# Patient Record
Sex: MALE | Race: WHITE | NOT HISPANIC OR LATINO
[De-identification: names, ages, dates, MRNs, and addresses within clinical notes are randomized per-mention and may not be internally consistent; named-entity substitution may affect disease eponyms.]

---

## 2018-03-28 PROBLEM — Z00.00 ENCOUNTER FOR PREVENTIVE HEALTH EXAMINATION: Status: ACTIVE | Noted: 2018-03-28

## 2018-04-16 ENCOUNTER — RX RENEWAL (OUTPATIENT)
Age: 78
End: 2018-04-16

## 2018-05-24 ENCOUNTER — RX RENEWAL (OUTPATIENT)
Age: 78
End: 2018-05-24

## 2018-08-02 ENCOUNTER — APPOINTMENT (OUTPATIENT)
Dept: CARDIOLOGY | Facility: CLINIC | Age: 78
End: 2018-08-02

## 2018-08-02 VITALS
HEART RATE: 74 BPM | TEMPERATURE: 97.9 F | HEIGHT: 67 IN | BODY MASS INDEX: 25.9 KG/M2 | WEIGHT: 165 LBS | SYSTOLIC BLOOD PRESSURE: 143 MMHG | OXYGEN SATURATION: 95 % | DIASTOLIC BLOOD PRESSURE: 82 MMHG

## 2018-08-02 DIAGNOSIS — Z80.51 FAMILY HISTORY OF MALIGNANT NEOPLASM OF KIDNEY: ICD-10-CM

## 2018-08-02 DIAGNOSIS — Z82.49 FAMILY HISTORY OF ISCHEMIC HEART DISEASE AND OTHER DISEASES OF THE CIRCULATORY SYSTEM: ICD-10-CM

## 2018-08-02 DIAGNOSIS — R91.1 SOLITARY PULMONARY NODULE: ICD-10-CM

## 2018-08-02 DIAGNOSIS — Z87.19 PERSONAL HISTORY OF OTHER DISEASES OF THE DIGESTIVE SYSTEM: ICD-10-CM

## 2018-08-02 DIAGNOSIS — N40.0 BENIGN PROSTATIC HYPERPLASIA WITHOUT LOWER URINARY TRACT SYMPMS: ICD-10-CM

## 2018-08-02 DIAGNOSIS — M19.90 UNSPECIFIED OSTEOARTHRITIS, UNSPECIFIED SITE: ICD-10-CM

## 2018-08-02 DIAGNOSIS — Z87.891 PERSONAL HISTORY OF NICOTINE DEPENDENCE: ICD-10-CM

## 2018-08-02 DIAGNOSIS — Z86.010 PERSONAL HISTORY OF COLONIC POLYPS: ICD-10-CM

## 2018-08-02 DIAGNOSIS — Z85.46 PERSONAL HISTORY OF MALIGNANT NEOPLASM OF PROSTATE: ICD-10-CM

## 2018-08-02 DIAGNOSIS — Z86.39 PERSONAL HISTORY OF OTHER ENDOCRINE, NUTRITIONAL AND METABOLIC DISEASE: ICD-10-CM

## 2018-08-02 DIAGNOSIS — Z86.79 PERSONAL HISTORY OF OTHER DISEASES OF THE CIRCULATORY SYSTEM: ICD-10-CM

## 2018-08-02 DIAGNOSIS — Z86.03 PERSONAL HISTORY OF NEOPLASM OF UNCERTAIN BEHAVIOR: ICD-10-CM

## 2018-08-06 PROBLEM — Z82.49 FAMILY HISTORY OF VALVULAR HEART DISEASE: Status: ACTIVE | Noted: 2018-08-06

## 2018-08-06 PROBLEM — Z82.49 FAMILY HISTORY OF HYPERTENSION: Status: ACTIVE | Noted: 2018-08-06

## 2018-08-07 ENCOUNTER — NON-APPOINTMENT (OUTPATIENT)
Age: 78
End: 2018-08-07

## 2018-08-07 PROBLEM — Z86.010 HISTORY OF COLONIC POLYPS: Status: RESOLVED | Noted: 2018-08-07 | Resolved: 2018-08-07

## 2018-08-07 PROBLEM — N40.0 BPH WITHOUT URINARY OBSTRUCTION: Status: RESOLVED | Noted: 2018-08-07 | Resolved: 2018-08-07

## 2018-08-07 PROBLEM — M19.90 OSTEOARTHRITIS: Status: RESOLVED | Noted: 2018-08-07 | Resolved: 2018-08-07

## 2018-08-07 PROBLEM — Z87.891 FORMER SMOKER: Status: ACTIVE | Noted: 2018-08-07

## 2018-08-07 PROBLEM — Z87.19 HISTORY OF GASTRITIS: Status: RESOLVED | Noted: 2018-08-07 | Resolved: 2018-08-07

## 2018-08-07 PROBLEM — Z86.79 HISTORY OF HYPERTENSION: Status: RESOLVED | Noted: 2018-08-07 | Resolved: 2018-08-07

## 2018-08-07 PROBLEM — Z82.49 FAMILY HISTORY OF ACUTE MYOCARDIAL INFARCTION: Status: ACTIVE | Noted: 2018-08-07

## 2018-08-07 PROBLEM — Z80.51 FAMILY HISTORY OF MALIGNANT NEOPLASM OF KIDNEY: Status: ACTIVE | Noted: 2018-08-07

## 2018-08-07 PROBLEM — R91.1 PULMONARY NODULE: Status: RESOLVED | Noted: 2018-08-07 | Resolved: 2018-08-07

## 2018-08-07 PROBLEM — Z86.39 HISTORY OF TYPE 2 DIABETES MELLITUS: Status: RESOLVED | Noted: 2018-08-07 | Resolved: 2018-08-07

## 2018-08-07 PROBLEM — Z86.03 HISTORY OF MONOCLONAL GAMMOPATHY: Status: RESOLVED | Noted: 2018-08-07 | Resolved: 2018-08-07

## 2018-08-07 PROBLEM — Z86.39 HISTORY OF HYPERLIPIDEMIA: Status: RESOLVED | Noted: 2018-08-07 | Resolved: 2018-08-07

## 2018-08-07 PROBLEM — Z85.46 HISTORY OF MALIGNANT NEOPLASM OF PROSTATE: Status: RESOLVED | Noted: 2018-08-07 | Resolved: 2018-08-07

## 2018-08-07 RX ORDER — ATORVASTATIN CALCIUM 10 MG/1
10 TABLET, FILM COATED ORAL
Refills: 0 | Status: ACTIVE | COMMUNITY

## 2018-08-07 RX ORDER — CHOLECALCIFEROL (VITAMIN D3) 25 MCG
TABLET ORAL
Refills: 0 | Status: ACTIVE | COMMUNITY

## 2018-08-07 RX ORDER — VITAMIN B COMPLEX
CAPSULE ORAL
Refills: 0 | Status: ACTIVE | COMMUNITY

## 2018-08-07 RX ORDER — VIT A/VIT C/VIT E/ZINC/COPPER 2148-113
TABLET ORAL
Refills: 0 | Status: ACTIVE | COMMUNITY

## 2018-08-07 RX ORDER — UBIDECARENONE/VIT E ACET 100MG-5
CAPSULE ORAL
Refills: 0 | Status: ACTIVE | COMMUNITY

## 2019-02-25 ENCOUNTER — APPOINTMENT (OUTPATIENT)
Dept: CARDIOLOGY | Facility: CLINIC | Age: 79
End: 2019-02-25
Payer: MEDICARE

## 2019-02-25 VITALS
HEART RATE: 68 BPM | WEIGHT: 165 LBS | OXYGEN SATURATION: 97 % | DIASTOLIC BLOOD PRESSURE: 69 MMHG | BODY MASS INDEX: 25.84 KG/M2 | SYSTOLIC BLOOD PRESSURE: 142 MMHG

## 2019-02-25 DIAGNOSIS — I25.10 ATHEROSCLEROTIC HEART DISEASE OF NATIVE CORONARY ARTERY W/OUT ANGINA PECTORIS: ICD-10-CM

## 2019-02-25 PROCEDURE — 99215 OFFICE O/P EST HI 40 MIN: CPT

## 2019-02-25 PROCEDURE — 93000 ELECTROCARDIOGRAM COMPLETE: CPT

## 2019-02-26 ENCOUNTER — NON-APPOINTMENT (OUTPATIENT)
Age: 79
End: 2019-02-26

## 2019-02-26 PROBLEM — I25.10 CORONARY ATHEROSCLEROSIS OF NATIVE CORONARY ARTERY: Status: RESOLVED | Noted: 2018-08-07 | Resolved: 2019-02-26

## 2019-02-26 RX ORDER — CALCIUM CARBONATE 600 MG
TABLET ORAL
Refills: 0 | Status: ACTIVE | COMMUNITY

## 2019-02-26 NOTE — DISCUSSION/SUMMARY
[FreeTextEntry1] : Atherosclerotic heart disease\par Atherosclerotic heart disease is stable. A 4/15 stress sestamibi study was normal. An 11/17 CT coronary angiogram  demonstrated a mildly elevated calcium score at 86. The LAD had a 1-25% stenosis the left circumflex 25-50% stenosis . T he  RCA was patent. A 2/19 chest CAT scan demonstrated mild coronary artery calcifications The resting 2/19 electrocardiogram shows no evidence of myocardial  ischemia or infarction. Left ventricular systolic function is normal as reflected by a left ventricular ejection fraction of 88% on the 4/15 gated sestamibi study 65-70% on a  7/16 echocardiogram and 61% on  an 11/17 CT angiogram. In view of the lack of angina, above noninvasive studies and clinical course continued medical management is indicated.\par \par I have recommended the following:\par 1.Continue the present medical regimen\par 2 no further cardiac testing for this problem at this time\par 3 risk factor modification \par \par Hypertension\par Hypertension has been controlled on the present medical regimen. In the setting of atherosclerotic heart disease and diabetes ACE-I/ARB therapy and beta blockers are attractive. Previous administration of ACE inhibitors ( lisinopril) were discontinued due to coughing. The doses of losartan  and metoprolol may be increased if required to maintain optimal levels.\par \par I have recommended the following:\par 1 low-salt low-fat low-cholesterol diabetic diet. Regular aerobic exercise\par 2 continue present medical regimen\par 3 increase Cozaar to and/or metoprolol doses if required to maintain optimal levels as discussed above.\par \par \par Valvular heart disease\par The 7/16 echocardiogram revealed mild thickening of the mitral valve leaflets and mild mitral insufficiency. Minimal aortic insufficiency was noted. The lethargy the ejection fraction was 65-70%. The present cardiac physical examination is not suggestive of severe mitral regurgitation. In view of the lack of symptoms, absence of heart failure clinical course continued monitoring without intervention is indicated at this time.\par \par I have recommended the following:\par 1. No further cardiac testing for this problem at this time\par 2. Anticipate echocardiogram reevaluation late 2019 or 2020\par \par \par Hyperlipidemia\par Hyperlipidemia represents a risk factor for progressive atherosclerotic disease. In the setting of atherosclerotic heart disease the target LDL level is about 70. The most recent lipid levels are not available for review. The dose of atorvastatin may be increased if required to obtain optimal levels. Nonpharmacological therapy, specifically diet exercise and weight loss are emphasized as major aspects of treatment.\par \par I have recommended the following:\par 1 low salt low fat low cholesterol diet. Regular aerobic exercise and weight loss\par 2 continue the present medical regimen\par 3 routine laboratory studies including lipid profile through primary care Dr. Guo\par 4 target LDL level to about 70 as discussed above\par 5 increase atorvastatin dose if required to obtain optimal levels as noted above.

## 2019-02-26 NOTE — PHYSICAL EXAM
[Normal Conjunctiva] : the conjunctiva exhibited no abnormalities [Auscultation Breath Sounds / Voice Sounds] : lungs were clear to auscultation bilaterally [Heart Rate And Rhythm] : heart rate and rhythm were normal [Heart Sounds] : normal S1 and S2 [Murmurs] : no murmurs present [Arterial Pulses Normal] : the arterial pulses were normal [Bowel Sounds] : normal bowel sounds [Abdomen Soft] : soft [Abdomen Tenderness] : non-tender [Abnormal Walk] : normal gait [Cyanosis, Localized] : no localized cyanosis [] : no rash [Affect] : the affect was normal [No Anxiety] : not feeling anxious [Edema] : no peripheral edema present [FreeTextEntry1] : dorsalis pedis pulses +2 bilaterally

## 2019-02-26 NOTE — REVIEW OF SYSTEMS
[Feeling Fatigued] : feeling fatigued [Eyeglasses] : currently wearing eyeglasses [Joint Pain] : joint pain [Negative] : Psychiatric

## 2019-02-26 NOTE — HISTORY OF PRESENT ILLNESS
[FreeTextEntry1] : 79-year-old man\par Routine followup\par "I feel okay" Mild dyspnea on exertion when going uphill is unchanged in the past and not progressively severe. No chest pain. No palpitations. No ankle edema. No orthopnea. No syncope.\par \par Royer is accompanied today by his wife.

## 2019-04-10 ENCOUNTER — APPOINTMENT (OUTPATIENT)
Dept: GASTROENTEROLOGY | Facility: CLINIC | Age: 79
End: 2019-04-10
Payer: MEDICARE

## 2019-04-10 ENCOUNTER — RECORD ABSTRACTING (OUTPATIENT)
Age: 79
End: 2019-04-10

## 2019-04-10 VITALS
SYSTOLIC BLOOD PRESSURE: 133 MMHG | HEIGHT: 67 IN | DIASTOLIC BLOOD PRESSURE: 85 MMHG | WEIGHT: 165 LBS | BODY MASS INDEX: 25.9 KG/M2 | HEART RATE: 66 BPM

## 2019-04-10 DIAGNOSIS — K21.9 GASTRO-ESOPHAGEAL REFLUX DISEASE W/OUT ESOPHAGITIS: ICD-10-CM

## 2019-04-10 PROCEDURE — 99214 OFFICE O/P EST MOD 30 MIN: CPT

## 2019-04-10 NOTE — ASSESSMENT
[FreeTextEntry1] : 1.  patient has recently noted rectal bleeding over the last three weeks\par 2.  he also has hx of prostate cancer, and he underwent external beam radiation last may and june 2018, over a period of eight weeds\par 3.  although there has been blood lately, none prior to that\par 4.  no mucous, no tenesmus...\par 5.  sister may have had colon cancer [he thinks]\par 6.  there is a comedone several cm from anal canal\par \par AS WE OBTAIN INFORMED CONSENT FOR COLONOSCOPY, UPPER ENDOSCOPY [EGD], OR BOTH PROCEDURES TOGETHER;\par \par As with all procedures, there are risks of which the patient should be aware\par \par 1.  Anesthesia; deep sedation with Propofol;  there is a small risk of aspiration and pulmonary infection.  The Anesthesiologist meets with the patient the morning of the procedure to discuss in more detail\par \par 2.  risk of bleeding; from removal of a polyp, or rarely, from biopsies, 1 % or less\par \par 3.  risk of injury or perforation of the colon or upper GI tract; one in a thousand or less,  from removing a polyp or from advancing the instrument\par \par \par DR FIELD RECOMMENDATIONS FOR OPTIMAL BOWEL PREP\par \par 1. split the Miralax drink: half the night before, and half the morning of\par 2.  dulcolax 10 mg [2 5 mg tablets]  before each half of the drink\par 3.  diet for breakfast and lunch the day before prep; light solid food, easily chewable, easily digested\par 4.  begin the prep the night before sometime after five pm, one hour after dulcolax is taken\par 5.  the night before; goal is to have loose bowel movements; take one to three extra doses of Miralax, either a 17 gram packet, or a scoopful of the powder, if needed to achieve loose stools\par 6.  the goal the morning of is to have clear liquid stools; otherwise, after the second half of the prep, you should take one to three extra doses of Miralax [see above]\par 7.  remember; the extra doses are only if you are not achieving good results; and the well prepped colon allows a more complete exam\par \par \par More than 50% of the face to face time was devoted to counseling and /or coordination of care.  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

## 2019-04-10 NOTE — REVIEW OF SYSTEMS
[FreeTextEntry5] : patient on metoprolol and losartan, dr Black...had CT angio., and there was mild obstructive dx...not major, but he was placed on these meds eight months ago..Dr Black did not advise any further cardovascular testing, as patient is basically free of any cardiovascular symptoms

## 2019-04-10 NOTE — PHYSICAL EXAM
[Normal Sphincter Tone] : normal sphincter tone [No Rectal Mass] : no rectal mass [Internal Hemorrhoid] : no internal hemorrhoids [External Hemorrhoid] : no external hemorrhoids [Occult Blood Positive] : stool was negative for occult blood

## 2019-04-10 NOTE — HISTORY OF PRESENT ILLNESS
[FreeTextEntry1] : 1.  patient presents for eval of scant blood on the surface of his bowel movements, occurs perhaps two or three days per week,\par onset of this symptoms was three weeks ago\par \par it appears there are clots on surface of stool...red and dark..\par \par had colonoscopy Nov 2016\par has had multiple colon polyps over the years..\par \par had a small boil karla anal recently\par \par when he needs to strain, he puts himself on metamucil and he is generally fine\par \par stool has not changed.\par \par the boil or small papule has not appeared..

## 2019-04-19 ENCOUNTER — APPOINTMENT (OUTPATIENT)
Dept: GASTROENTEROLOGY | Facility: HOSPITAL | Age: 79
End: 2019-04-19

## 2020-06-04 ENCOUNTER — APPOINTMENT (OUTPATIENT)
Dept: CARDIOLOGY | Facility: CLINIC | Age: 80
End: 2020-06-04
Payer: MEDICARE

## 2020-06-04 ENCOUNTER — NON-APPOINTMENT (OUTPATIENT)
Age: 80
End: 2020-06-04

## 2020-06-04 VITALS
SYSTOLIC BLOOD PRESSURE: 130 MMHG | DIASTOLIC BLOOD PRESSURE: 80 MMHG | WEIGHT: 157 LBS | OXYGEN SATURATION: 97 % | BODY MASS INDEX: 24.59 KG/M2 | HEART RATE: 85 BPM

## 2020-06-04 VITALS
SYSTOLIC BLOOD PRESSURE: 130 MMHG | BODY MASS INDEX: 24.59 KG/M2 | HEART RATE: 85 BPM | WEIGHT: 157 LBS | DIASTOLIC BLOOD PRESSURE: 80 MMHG | OXYGEN SATURATION: 97 %

## 2020-06-04 DIAGNOSIS — Z86.69 PERSONAL HISTORY OF OTHER DISEASES OF THE NERVOUS SYSTEM AND SENSE ORGANS: ICD-10-CM

## 2020-06-04 PROCEDURE — 93000 ELECTROCARDIOGRAM COMPLETE: CPT

## 2020-06-04 PROCEDURE — 99215 OFFICE O/P EST HI 40 MIN: CPT

## 2020-06-05 PROBLEM — Z86.69 HISTORY OF RETINAL DETACHMENT: Status: RESOLVED | Noted: 2020-06-05 | Resolved: 2020-06-05

## 2020-06-05 RX ORDER — DENOSUMAB 60 MG/ML
60 INJECTION SUBCUTANEOUS
Refills: 0 | Status: ACTIVE | COMMUNITY

## 2020-06-05 NOTE — PHYSICAL EXAM
[Normal Conjunctiva] : the conjunctiva exhibited no abnormalities [Auscultation Breath Sounds / Voice Sounds] : lungs were clear to auscultation bilaterally [Heart Rate And Rhythm] : heart rate and rhythm were normal [Heart Sounds] : normal S1 and S2 [Murmurs] : no murmurs present [Arterial Pulses Normal] : the arterial pulses were normal [Edema] : no peripheral edema present [Bowel Sounds] : normal bowel sounds [Abdomen Soft] : soft [Abdomen Tenderness] : non-tender [Abnormal Walk] : normal gait [Cyanosis, Localized] : no localized cyanosis [] : no rash [Affect] : the affect was normal [No Anxiety] : not feeling anxious [FreeTextEntry1] : dorsalis pedis pulses +2 bilaterally

## 2020-06-05 NOTE — HISTORY OF PRESENT ILLNESS
[FreeTextEntry1] : 80-year-old man\par Routine followup\par Dyspnea on exertion is unchanged from in the past. No chest pain. No palpitations. No ankle edema. No orthopnea. No syncope.  Mr. 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

## 2020-06-05 NOTE — REVIEW OF SYSTEMS
[Feeling Fatigued] : feeling fatigued [Eyeglasses] : currently wearing eyeglasses [Cough] : cough [Joint Pain] : joint pain [Negative] : Psychiatric

## 2020-06-05 NOTE — DISCUSSION/SUMMARY
[FreeTextEntry1] : Atherosclerotic heart disease\par Atherosclerotic heart disease is stable. A 4/15 stress sestamibi study was normal. An 11/17 CT coronary angiogram  demonstrated a mildly elevated calcium score at 86. The LAD had a 1-25% stenosis the left circumflex 25-50% stenosis . T he  RCA was patent. A 2/19 chest CAT scan demonstrated mild coronary artery calcifications The resting 6/20  electrocardiogram shows no evidence of myocardial  ischemia or infarction. Left ventricular systolic function is normal as reflected by a left ventricular ejection fraction of 88% on the 4/15 gated sestamibi study 65-70% on a  7/16 echocardiogram and 61% on  an 11/17 CT angiogram. In view of the lack of angina, above noninvasive studies and clinical course continued medical management is indicated.\par \par I have recommended the following:\par 1.Continue the present medical regimen including aspirin  81 mg/day  (prescribed but has not been taking )\par 2 no further cardiac testing for this problem at this time\par 3 risk factor modification \par 4. Echocardiogram  with  next office evaluation in 6 months\par \par \par Hypertension\par Hypertension has been controlled on the present medical regimen. In the setting of atherosclerotic heart disease and diabetes ACE-I/ARB therapy and beta blockers are attractive. Previous administration of ACE inhibitors ( lisinopril) were discontinued due to coughing. The doses of losartan  and metoprolol may be increased if required to maintain optimal levels.\par \par I have recommended the following:\par 1 low-salt low-fat low-cholesterol diabetic diet. Regular aerobic exercise\par 2 continue present medical regimen\par 3 increase Cozaar  and/or metoprolol doses if required to maintain optimal levels as discussed above.\par \par \par Valvular heart disease\par The 7/16 echocardiogram revealed mild thickening of the mitral valve leaflets and mild mitral insufficiency. Minimal aortic insufficiency was noted. The lethargy the ejection fraction was 65-70%. The present cardiac physical examination is not suggestive of severe mitral regurgitation. In view of the lack of symptoms, absence of heart failure clinical course continued monitoring without intervention is indicated at this time.\par \par I have recommended the following:\par 1. No further cardiac testing for this problem at this time\par 2. Echocardiogram reevaluation  with next office evaluation in 6 months\par \par \par \par Hyperlipidemia\par Hyperlipidemia represents a risk factor for progressive atherosclerotic disease. In the setting of atherosclerotic heart disease the target LDL level is about 70. The most recent lipid levels are not available for review. The dose of atorvastatin may be increased if required to obtain optimal levels. Nonpharmacological therapy, specifically diet exercise and weight loss are emphasized as major aspects of treatment.\par \par I have recommended the following:\par 1 low salt low fat low cholesterol diet. Regular aerobic exercise and weight loss\par 2 continue the present medical regimen\par 3 routine laboratory studies including lipid profile through primary care Dr. Guo\par 4 target LDL level to about 70 as discussed above\par 5 increase atorvastatin dose if required to obtain optimal levels as noted above. \par \par \par The diagnosis, prognosis, risks, options and alternatives were explained at length to the patient. All questions were answered. Issues discussed included atherosclerotic heart disease, hypertension, hyperlipidemia and valvular heart disease.\par \par Counseling and/or coordination of care\par Time was a significant factor this patient encounter. Total time spent with the patient was 40 minutes. 50% of the time was devoted to counseling and/or coordination of care.

## 2020-12-03 ENCOUNTER — APPOINTMENT (OUTPATIENT)
Dept: CARDIOLOGY | Facility: CLINIC | Age: 80
End: 2020-12-03
Payer: MEDICARE

## 2020-12-03 PROCEDURE — 93306 TTE W/DOPPLER COMPLETE: CPT

## 2020-12-06 DIAGNOSIS — Z86.79 PERSONAL HISTORY OF OTHER DISEASES OF THE CIRCULATORY SYSTEM: ICD-10-CM

## 2020-12-09 ENCOUNTER — APPOINTMENT (OUTPATIENT)
Dept: CARDIOLOGY | Facility: CLINIC | Age: 80
End: 2020-12-09
Payer: MEDICARE

## 2020-12-09 ENCOUNTER — NON-APPOINTMENT (OUTPATIENT)
Age: 80
End: 2020-12-09

## 2020-12-09 VITALS
SYSTOLIC BLOOD PRESSURE: 130 MMHG | WEIGHT: 162.44 LBS | OXYGEN SATURATION: 97 % | TEMPERATURE: 97.2 F | DIASTOLIC BLOOD PRESSURE: 70 MMHG | HEART RATE: 68 BPM | BODY MASS INDEX: 25.44 KG/M2

## 2020-12-09 PROCEDURE — 93000 ELECTROCARDIOGRAM COMPLETE: CPT

## 2020-12-09 PROCEDURE — 99215 OFFICE O/P EST HI 40 MIN: CPT

## 2020-12-10 RX ORDER — METFORMIN HYDROCHLORIDE 500 MG/1
500 TABLET, COATED ORAL
Refills: 0 | Status: ACTIVE | COMMUNITY

## 2020-12-10 NOTE — DISCUSSION/SUMMARY
[FreeTextEntry1] : Atherosclerotic heart disease\par Atherosclerotic heart disease is stable. A 4/15 stress sestamibi study was normal. An 11/17 CT coronary angiogram  demonstrated a mildly elevated calcium score at 86. The LAD had a 1-25% stenosis the left circumflex 25-50% stenosis . T he  RCA was patent. A 2/19 chest CAT scan demonstrated mild coronary artery calcifications The resting 6/20  electrocardiogram shows no evidence of myocardial  ischemia or infarction. Left ventricular systolic function is normal as reflected by a left ventricular ejection fraction of 88% on the 4/15 gated sestamibi study  61% on  an 11/17 CT angiogram and 62% on the 12/20 echocardiogram  In view of the lack of angina, above noninvasive studies and clinical course continued medical management is indicated.\par \par I have recommended the following:\par 1.Continue the present medical regimen including aspirin  81 mg/day  (prescribed but has not been taking )\par 2 no further cardiac testing for this problem at this time\par 3 risk factor modification \par \par \par \par Hypertension\par Hypertension has been controlled on the present medical regimen. In the setting of atherosclerotic heart disease and diabetes ACE-I/ARB therapy and beta blockers are attractive. Previous administration of ACE inhibitors ( lisinopril) were discontinued due to coughing. The doses of losartan  and metoprolol may be increased if required to maintain optimal levels.\par \par I have recommended the following:\par 1 low-salt low-fat low-cholesterol diabetic diet. Regular aerobic exercise\par 2 continue present medical regimen\par 3 increase Cozaar  and/or metoprolol doses if required to maintain optimal levels as discussed above.\par \par \par Valvular heart disease\par The 12/20  echocardiogram revealed mild thickening of the mitral valve leaflets and mild mitral insufficiency.  Aortic sclerosis  was noted.  The pulmonary artery systolic pressure was normal at 22 mmHg. The left ventricular  ejection fraction was 62%. The present cardiac physical examination is not suggestive of severe mitral regurgitation. In view of the lack of symptoms, absence of heart failure clinical course continued monitoring without intervention is indicated at this time.\par \par I have recommended the following:\par 1. No further cardiac testing for this problem at this time\par \par \par \par \par Hyperlipidemia\par Hyperlipidemia represents a risk factor for progressive atherosclerotic disease. In the setting of atherosclerotic heart disease the target LDL level is about 70. The most recent lipid levels are not available for review. The dose of atorvastatin may be increased if required to obtain optimal levels. Nonpharmacological therapy, specifically diet exercise and weight loss are emphasized as major aspects of treatment.\par \par I have recommended the following:\par 1 low salt low fat low cholesterol diet. Regular aerobic exercise and weight loss\par 2 continue the present medical regimen\par 3 routine laboratory studies including lipid profile through primary care Dr. Guo\par 4 target LDL level to about 70 as discussed above\par 5 increase atorvastatin dose if required to obtain optimal levels as noted above. \par \par \par The diagnosis, prognosis, risks, options and alternatives were explained at length to the patient. All questions were answered. Issues discussed included atherosclerotic heart disease, hypertension, hyperlipidemia and valvular heart disease.\par \par Counseling and/or coordination of care\par Time was a significant factor this patient encounter. Total time spent with the patient was 40 minutes. 50% of the time was devoted to counseling and/or coordination of care.

## 2020-12-10 NOTE — HISTORY OF PRESENT ILLNESS
[FreeTextEntry1] : 80-year-old man\par Routine followup\par "I feel okay." Royer denies chest pain, palpitations, shortness of breath at rest or syncope.

## 2021-06-22 ENCOUNTER — APPOINTMENT (OUTPATIENT)
Dept: CARDIOLOGY | Facility: CLINIC | Age: 81
End: 2021-06-22
Payer: MEDICARE

## 2021-06-22 ENCOUNTER — NON-APPOINTMENT (OUTPATIENT)
Age: 81
End: 2021-06-22

## 2021-06-22 VITALS
WEIGHT: 164 LBS | OXYGEN SATURATION: 99 % | BODY MASS INDEX: 25.69 KG/M2 | DIASTOLIC BLOOD PRESSURE: 70 MMHG | SYSTOLIC BLOOD PRESSURE: 146 MMHG | HEART RATE: 77 BPM

## 2021-06-22 PROCEDURE — 93000 ELECTROCARDIOGRAM COMPLETE: CPT

## 2021-06-22 PROCEDURE — 99214 OFFICE O/P EST MOD 30 MIN: CPT

## 2021-06-24 NOTE — DISCUSSION/SUMMARY
[FreeTextEntry1] : Atherosclerotic heart disease\par Atherosclerotic heart disease is stable. A 4/15 stress sestamibi study was normal. An 11/17 CT coronary angiogram  demonstrated a mildly elevated calcium score at 86. The LAD had a 1-25% stenosis the left circumflex 25-50% stenosis . T he  RCA was patent. A 2/19 chest CAT scan demonstrated mild coronary artery calcifications The resting 6/21  electrocardiogram shows no evidence of myocardial  ischemia or infarction. Left ventricular systolic function is normal as reflected by a left ventricular ejection fraction of 88% on the 4/15 gated sestamibi study  61% on  an 11/17 CT angiogram and 62% on the 12/20 echocardiogram  In view of the lack of angina, above noninvasive studies and clinical course continued medical management is indicated.\par \par I have recommended the following:\par 1.Continue the present medical regimen including aspirin  81 mg/day  (prescribed but has not been taking )\par 2 no further cardiac testing for this problem at this time\par 3 risk factor modification \par \par \par \par Hypertension\par Hypertension has been controlled on the present medical regimen. In the setting of atherosclerotic heart disease and diabetes ACE-I/ARB therapy and beta blockers are attractive. Previous administration of ACE inhibitors ( lisinopril) were discontinued due to coughing. The doses of losartan  and metoprolol may be increased if required to maintain optimal levels.\par \par I have recommended the following:\par 1 low-salt low-fat low-cholesterol diabetic diet. Regular aerobic exercise\par 2 continue present medical regimen\par 3 increase Cozaar  and/or metoprolol doses if required to maintain optimal levels as discussed above.\par \par \par \par \par Valvular heart disease\par The 12/20  echocardiogram revealed mild thickening of the mitral valve leaflets and mild mitral insufficiency.  Aortic sclerosis  was noted.  The pulmonary artery systolic pressure was normal at 22 mmHg. The left ventricular  ejection fraction was 62%. The present cardiac physical examination is not suggestive of severe mitral regurgitation. In view of the lack of symptoms, absence of heart failure clinical course continued monitoring without intervention is indicated at this time.\par \par I have recommended the following:\par 1. No further cardiac testing for this problem at this time\par \par \par \par \par Hyperlipidemia\par Hyperlipidemia represents a risk factor for progressive atherosclerotic disease. In the setting of atherosclerotic heart disease the target LDL level is about 70. The most recent lipid levels are not available for review. The dose of atorvastatin may be increased if required to obtain optimal levels. Nonpharmacological therapy, specifically diet exercise and weight loss are emphasized as major aspects of treatment.\par \par I have recommended the following:\par 1 low salt low fat low cholesterol diet. Regular aerobic exercise and weight loss\par 2 continue the present medical regimen\par 3 routine laboratory studies including lipid profile through primary care Dr. Guo\par 4 target LDL level to about 70 as discussed above\par 5 increase atorvastatin dose if required to obtain optimal levels as noted above. \par \par \par The diagnosis, prognosis, risks, options and alternatives were explained at length to the patient. All questions were answered. Issues discussed included atherosclerotic heart disease, hypertension, hyperlipidemia non invasive cardiac testing  valvular heart disease diet and exercise\par \par Counseling and/or coordination of care\par Time was a significant factor this patient encounter. Total time spent with the patient was 30  minutes. 50% of the time was devoted to counseling and/or coordination of care.

## 2021-06-24 NOTE — REVIEW OF SYSTEMS
[Feeling Fatigued] : feeling fatigued [Hearing Loss] : hearing loss [Joint Pain] : joint pain [Negative] : Heme/Lymph [FreeTextEntry3] : glasses [FreeTextEntry5] : see history of present illness

## 2021-06-24 NOTE — HISTORY OF PRESENT ILLNESS
[FreeTextEntry1] : 81-year-old man \par Routine followup\par "I feel okay"  Royer denies chest pain, shortness of breath at rest palpitation or syncope. Dyspnea on exertion is unchanged from in the past and not progressive or severe.\par

## 2021-06-30 ENCOUNTER — NON-APPOINTMENT (OUTPATIENT)
Age: 81
End: 2021-06-30

## 2021-06-30 ENCOUNTER — APPOINTMENT (OUTPATIENT)
Dept: OPHTHALMOLOGY | Facility: CLINIC | Age: 81
End: 2021-06-30
Payer: MEDICARE

## 2021-06-30 PROCEDURE — 92250 FUNDUS PHOTOGRAPHY W/I&R: CPT

## 2021-06-30 PROCEDURE — 92004 COMPRE OPH EXAM NEW PT 1/>: CPT

## 2021-12-04 ENCOUNTER — TRANSCRIPTION ENCOUNTER (OUTPATIENT)
Age: 81
End: 2021-12-04

## 2021-12-27 ENCOUNTER — APPOINTMENT (OUTPATIENT)
Dept: CARDIOLOGY | Facility: CLINIC | Age: 81
End: 2021-12-27
Payer: MEDICARE

## 2021-12-27 ENCOUNTER — NON-APPOINTMENT (OUTPATIENT)
Age: 81
End: 2021-12-27

## 2021-12-27 VITALS
OXYGEN SATURATION: 99 % | BODY MASS INDEX: 24.9 KG/M2 | DIASTOLIC BLOOD PRESSURE: 70 MMHG | WEIGHT: 159 LBS | SYSTOLIC BLOOD PRESSURE: 142 MMHG | HEART RATE: 81 BPM

## 2021-12-27 PROCEDURE — 93000 ELECTROCARDIOGRAM COMPLETE: CPT

## 2021-12-27 PROCEDURE — 99214 OFFICE O/P EST MOD 30 MIN: CPT

## 2021-12-28 NOTE — DISCUSSION/SUMMARY
[FreeTextEntry1] : Atherosclerotic heart disease\par Atherosclerotic heart disease is stable. A 4/15 stress sestamibi study was normal. An 11/17 CT coronary angiogram  demonstrated a mildly elevated calcium score at 86. The LAD had a 1-25% stenosis the left circumflex 25-50% stenosis . T he  RCA was patent. A 2/19 chest CAT scan demonstrated mild coronary artery calcifications The resting  12/21   electrocardiogram shows no evidence of myocardial  ischemia or infarction. Left ventricular systolic function is normal as reflected by a left ventricular ejection fraction of 88% on the 4/15 gated sestamibi study  61% on  an 11/17 CT angiogram and 62% on the 12/20 echocardiogram  In view of the lack of angina, above noninvasive studies and clinical course continued medical management is indicated.\par \par I have recommended the following:\par 1.Continue the present medical regimen \par 2 no further cardiac testing for this problem at this time\par 3 risk factor modification \par \par \par \par Hypertension\par Hypertension has been controlled on the present medical regimen. In the setting of atherosclerotic heart disease and diabetes ACE-I/ARB therapy and beta blockers are attractive. Previous administration of ACE inhibitors ( lisinopril) were discontinued due to coughing. The doses of losartan  and metoprolol may be increased if required to maintain optimal levels.\par \par I have recommended the following:\par 1 low-salt low-fat low-cholesterol diabetic diet. Regular aerobic exercise\par 2 continue present medical regimen\par 3 increase Cozaar  and/or metoprolol doses if required to maintain optimal levels as discussed above.\par \par \par \par \par Valvular heart disease\par The 12/20  echocardiogram revealed mild thickening of the mitral valve leaflets and mild mitral insufficiency.  Aortic sclerosis  was noted.  The pulmonary artery systolic pressure was normal at 22 mmHg. The left ventricular  ejection fraction was 62%. The present cardiac physical examination is not suggestive of severe mitral regurgitation. In view of the lack of symptoms, absence of heart failure clinical course continued monitoring without intervention is indicated at this time.\par \par I have recommended the following:\par 1. No further cardiac testing for this problem at this time\par \par \par \par \par Hyperlipidemia\par Hyperlipidemia represents a risk factor for progressive atherosclerotic disease. In the setting of atherosclerotic heart disease the target LDL level is about 70. The most recent lipid levels are not available for review. The dose of atorvastatin may be increased if required to obtain optimal levels. Nonpharmacological therapy, specifically diet exercise and weight loss are emphasized as major aspects of treatment.\par \par I have recommended the following:\par 1 low salt low fat low cholesterol diet. Regular aerobic exercise and weight loss\par 2 continue the present medical regimen\par 3 routine laboratory studies including lipid profile through primary care Dr. Guo\par 4 target LDL level to about 70 as discussed above\par 5 increase atorvastatin dose if required to obtain optimal levels as noted above. \par \par \par The diagnosis, prognosis, risks, options and alternatives were explained at length to the patient. All questions were answered. Issues discussed included atherosclerotic heart disease, hypertension, hyperlipidemia non invasive cardiac testing  valvular heart disease diet and exercise\par \par Counseling and/or coordination of care\par Time was a significant factor this patient encounter. Total time spent with the patient was 30  minutes. 50% of the time was devoted to counseling and/or coordination of care.

## 2021-12-28 NOTE — HISTORY OF PRESENT ILLNESS
[FreeTextEntry1] : 81-year-old man\par Routine followup\par "I feel okay."  No chest pain. No palpitations. No ankle edema. No syncope. Dyspnea on exertion when walking uphill is mild and unchanged from in the past.

## 2021-12-28 NOTE — REVIEW OF SYSTEMS
[Feeling Fatigued] : feeling fatigued [Dyspnea on exertion] : dyspnea during exertion [Cough] : cough [Joint Pain] : joint pain [Negative] : Psychiatric [FreeTextEntry3] : glasses [FreeTextEntry5] : see history of present illness [FreeTextEntry6] : minor nonproductive cough is improving

## 2021-12-28 NOTE — PHYSICAL EXAM
[Normal Conjunctiva] : the conjunctiva exhibited no abnormalities [Auscultation Breath Sounds / Voice Sounds] : lungs were clear to auscultation bilaterally [Heart Rate And Rhythm] : heart rate and rhythm were normal [Heart Sounds] : normal S1 and S2 [Murmurs] : no murmurs present [Arterial Pulses Normal] : the arterial pulses were normal [Edema] : no peripheral edema present [Bowel Sounds] : normal bowel sounds [Abdomen Soft] : soft [Abdomen Tenderness] : non-tender [Abnormal Walk] : normal gait [Cyanosis, Localized] : no localized cyanosis [FreeTextEntry1] : dorsalis pedis pulses +2 bilaterally [] : no rash [Affect] : the affect was normal [No Anxiety] : not feeling anxious

## 2022-07-18 ENCOUNTER — NON-APPOINTMENT (OUTPATIENT)
Age: 82
End: 2022-07-18

## 2022-07-18 ENCOUNTER — APPOINTMENT (OUTPATIENT)
Dept: CARDIOLOGY | Facility: CLINIC | Age: 82
End: 2022-07-18

## 2022-07-18 VITALS
OXYGEN SATURATION: 100 % | SYSTOLIC BLOOD PRESSURE: 116 MMHG | HEART RATE: 84 BPM | WEIGHT: 157 LBS | BODY MASS INDEX: 24.59 KG/M2 | DIASTOLIC BLOOD PRESSURE: 60 MMHG

## 2022-07-18 DIAGNOSIS — Z86.69 PERSONAL HISTORY OF OTHER DISEASES OF THE NERVOUS SYSTEM AND SENSE ORGANS: ICD-10-CM

## 2022-07-18 PROCEDURE — 99214 OFFICE O/P EST MOD 30 MIN: CPT

## 2022-07-18 PROCEDURE — 93000 ELECTROCARDIOGRAM COMPLETE: CPT

## 2022-07-19 PROBLEM — Z86.69 HISTORY OF MACULAR DEGENERATION: Status: RESOLVED | Noted: 2022-07-19 | Resolved: 2022-07-19

## 2022-07-19 NOTE — HISTORY OF PRESENT ILLNESS
[FreeTextEntry1] : 82-year-old male\par Routine followup\par " I feel okay."  Royer denies chest pain,  shortness of breath or syncope.

## 2022-07-19 NOTE — DISCUSSION/SUMMARY
[FreeTextEntry1] : Atherosclerotic heart disease\par Atherosclerotic heart disease is stable. A 4/15 stress sestamibi study was normal. An 11/17 CT coronary angiogram  demonstrated a mildly elevated calcium score at 86. The LAD had a 1-25% stenosis the left circumflex 25-50% stenosis . T he  RCA was patent. A 2/19 chest CAT scan demonstrated mild coronary artery calcifications The resting   7/22    electrocardiogram shows no evidence of myocardial  ischemia or infarction. Left ventricular systolic function is normal as reflected by a left ventricular ejection fraction of 88% on the 4/15 gated sestamibi study  61% on  an 11/17 CT angiogram and 62% on the 12/20 echocardiogram  In view of the lack of angina, above noninvasive studies and clinical course continued medical management is indicated.\par \par I have recommended the following:\par 1.Continue the present medical regimen \par 2 no further cardiac testing for this problem at this time\par 3 risk factor modification \par \par \par \par Hypertension\par Hypertension has been controlled on the present medical regimen. In the setting of atherosclerotic heart disease and diabetes ACE-I/ARB therapy and beta blockers are attractive. Previous administration of ACE inhibitors ( lisinopril) were discontinued due to coughing. The doses of losartan  and metoprolol may be increased if required to maintain optimal levels.\par \par I have recommended the following:\par 1 low-salt low-fat low-cholesterol diabetic diet. Regular aerobic exercise\par 2 continue present medical regimen\par 3 increase Cozaar  and/or metoprolol doses if required to maintain optimal levels as discussed above.\par \par \par \par \par Valvular heart disease\par The 12/20  echocardiogram revealed mild thickening of the mitral valve leaflets and mild mitral insufficiency.  Aortic sclerosis  was noted.  The pulmonary artery systolic pressure was normal at 22 mmHg. The left ventricular  ejection fraction was 62%. The present cardiac physical examination is not suggestive of severe mitral regurgitation. In view of the lack of symptoms, absence of heart failure clinical course continued monitoring without intervention is indicated at this time.\par \par I have recommended the following:\par 1. No further cardiac testing for this problem at this time\par \par \par \par \par Hyperlipidemia\par Hyperlipidemia represents a risk factor for progressive atherosclerotic disease. In the setting of atherosclerotic heart disease the target LDL level is about 70. The most recent lipid levels are not available for review. The dose of atorvastatin may be increased if required to obtain optimal levels. Nonpharmacological therapy, specifically diet exercise and weight loss are emphasized as major aspects of treatment.\par \par I have recommended the following:\par 1 low salt low fat low cholesterol diet. Regular aerobic exercise and weight loss\par 2 continue the present medical regimen\par 3 routine laboratory studies including lipid profile through primary care Dr. Guo\par 4 target LDL level to about 70 as discussed above\par 5 increase atorvastatin dose if required to obtain optimal levels as noted above. \par \par \par \par \par The diagnosis, prognosis, risks, options and alternatives were explained at length to the patient. All questions were answered. Issues discussed included atherosclerotic heart disease, hypertension, hyperlipidemia non invasive cardiac testing  valvular heart disease diet and exercise\par \par Counseling and/or coordination of care\par Time was a significant factor this patient encounter. Total time spent with the patient was 30  minutes. 50% of the time was devoted to counseling and/or coordination of care.

## 2022-09-15 ENCOUNTER — NON-APPOINTMENT (OUTPATIENT)
Age: 82
End: 2022-09-15

## 2023-01-17 ENCOUNTER — NON-APPOINTMENT (OUTPATIENT)
Age: 83
End: 2023-01-17

## 2023-01-17 ENCOUNTER — APPOINTMENT (OUTPATIENT)
Dept: CARDIOLOGY | Facility: CLINIC | Age: 83
End: 2023-01-17
Payer: MEDICARE

## 2023-01-17 VITALS
BODY MASS INDEX: 25.43 KG/M2 | DIASTOLIC BLOOD PRESSURE: 62 MMHG | HEART RATE: 83 BPM | OXYGEN SATURATION: 98 % | HEIGHT: 67 IN | WEIGHT: 162 LBS | SYSTOLIC BLOOD PRESSURE: 130 MMHG

## 2023-01-17 PROCEDURE — 93000 ELECTROCARDIOGRAM COMPLETE: CPT

## 2023-01-17 PROCEDURE — 99213 OFFICE O/P EST LOW 20 MIN: CPT

## 2023-01-23 NOTE — HISTORY OF PRESENT ILLNESS
[FreeTextEntry1] : 83-year-old man\par Routine follow-up\par "I feel okay."  Royer denies chest pain, shortness of breath at rest, palpitations ankle edema orthopnea or syncope.  Dyspnea on exertion when going uphill is unchanged from in the past and not progressive or severe.

## 2023-01-23 NOTE — DISCUSSION/SUMMARY
[FreeTextEntry1] : Atherosclerotic heart disease\par Atherosclerotic heart disease is stable. A 4/15 stress sestamibi study was normal. An 11/17 CT coronary angiogram  demonstrated a mildly elevated calcium score at 86. The LAD had a 1-25% stenosis the left circumflex 25-50% stenosis . T he  RCA was patent. A 2/19 chest CAT scan demonstrated mild coronary artery calcifications The resting   1/23    electrocardiogram is normal  showing  no evidence of myocardial  ischemia or infarction. Left ventricular systolic function is normal as reflected by a left ventricular ejection fraction of 88% on the 4/15 gated sestamibi study  61% on  an 11/17 CT angiogram and 62% on the 12/20 echocardiogram  In view of the lack of angina, above noninvasive studies and clinical course continued medical management is indicated.\par \par I have recommended the following:\par 1.Continue the present medical regimen \par 2 no further cardiac testing for this problem at this time\par 3 risk factor modification \par \par \par \par Hypertension\par Hypertension has been controlled on the present medical regimen. In the setting of atherosclerotic heart disease and diabetes ACE-I/ARB therapy and beta blockers are attractive. Previous administration of ACE inhibitors ( lisinopril) were discontinued due to coughing. The doses of losartan  and/or  metoprolol may be increased if required to maintain optimal levels.\par \par I have recommended the following:\par 1 low-salt low-fat low-cholesterol diabetic diet. Regular aerobic exercise\par 2 continue present medical regimen\par 3 increase Cozaar  and/or metoprolol doses if required to maintain optimal levels as discussed above.\par \par \par \par \par Valvular heart disease\par The 12/20  echocardiogram revealed mild thickening of the mitral valve leaflets and mild mitral insufficiency.  Aortic sclerosis  was noted.  The pulmonary artery systolic pressure was normal at 22 mmHg. The left ventricular  ejection fraction was 62%. The present cardiac physical examination is not suggestive of severe mitral regurgitation. In view of the lack of symptoms, absence of heart failure clinical course continued monitoring without intervention is indicated at this time.\par \par I have recommended the following:\par 1. No further cardiac testing for this problem at this time\par \par \par \par \par Hyperlipidemia\par Hyperlipidemia represents a risk factor for progressive atherosclerotic disease. In the setting of atherosclerotic heart disease the target LDL level is about 70. The most recent lipid levels are not available for review. The dose of atorvastatin may be increased if required to obtain optimal levels. Nonpharmacological therapy, specifically diet exercise and weight loss are emphasized as major aspects of treatment.\par \par I have recommended the following:\par 1 low salt low fat low cholesterol diet. Regular aerobic exercise and weight loss\par 2 continue the present medical regimen\par 3 routine laboratory studies including lipid profile through primary care Dr. Guo\par 4 target LDL level to about 70 as discussed above\par 5 increase atorvastatin dose if required to obtain optimal levels as noted above. \par \par \par \par \par The diagnosis, prognosis, risks, options and alternatives were explained at length to the patient. All questions were answered. Issues discussed included atherosclerotic heart disease, hypertension, hyperlipidemia non invasive cardiac testing  valvular heart disease diet and exercise\par \par Counseling and/or coordination of care\par Time was a significant factor this patient encounter. Total time spent with the patient was 25  minutes. 50% of the time was devoted to counseling and/or coordination of care.

## 2023-01-23 NOTE — REVIEW OF SYSTEMS
[Feeling Fatigued] : feeling fatigued [Joint Pain] : joint pain [Negative] : Heme/Lymph [FreeTextEntry3] : glasses [FreeTextEntry5] : See history of present illness

## 2023-04-04 ENCOUNTER — APPOINTMENT (OUTPATIENT)
Dept: GASTROENTEROLOGY | Facility: CLINIC | Age: 83
End: 2023-04-04
Payer: MEDICARE

## 2023-04-04 VITALS
HEART RATE: 68 BPM | DIASTOLIC BLOOD PRESSURE: 72 MMHG | WEIGHT: 155 LBS | OXYGEN SATURATION: 98 % | BODY MASS INDEX: 24.33 KG/M2 | HEIGHT: 67 IN | SYSTOLIC BLOOD PRESSURE: 127 MMHG

## 2023-04-04 PROCEDURE — 99214 OFFICE O/P EST MOD 30 MIN: CPT

## 2023-04-04 RX ORDER — ASPIRIN 325 MG/1
TABLET, FILM COATED ORAL
Refills: 0 | Status: DISCONTINUED | COMMUNITY
End: 2023-04-04

## 2023-04-04 NOTE — PHYSICAL EXAM
[Normal] : normal bowel sounds, non-tender, no masses, soft, no no hepato-splenomegaly [Abdomen Tenderness] : non-tender [No Masses] : no abdominal mass palpated [Abdomen Soft] : soft [] : no hepatosplenomegaly

## 2023-04-04 NOTE — HISTORY OF PRESENT ILLNESS
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

## 2023-04-12 ENCOUNTER — RESULT REVIEW (OUTPATIENT)
Age: 83
End: 2023-04-12

## 2023-04-13 ENCOUNTER — TRANSCRIPTION ENCOUNTER (OUTPATIENT)
Age: 83
End: 2023-04-13

## 2023-04-13 ENCOUNTER — APPOINTMENT (OUTPATIENT)
Dept: GASTROENTEROLOGY | Facility: HOSPITAL | Age: 83
End: 2023-04-13

## 2023-07-31 ENCOUNTER — APPOINTMENT (OUTPATIENT)
Dept: CARDIOLOGY | Facility: CLINIC | Age: 83
End: 2023-07-31
Payer: MEDICARE

## 2023-07-31 VITALS
HEART RATE: 75 BPM | DIASTOLIC BLOOD PRESSURE: 69 MMHG | BODY MASS INDEX: 24.8 KG/M2 | HEIGHT: 67 IN | SYSTOLIC BLOOD PRESSURE: 127 MMHG | OXYGEN SATURATION: 98 % | WEIGHT: 158 LBS

## 2023-07-31 PROCEDURE — 99213 OFFICE O/P EST LOW 20 MIN: CPT

## 2023-08-09 NOTE — PHYSICAL EXAM
[Normal Conjunctiva] : the conjunctiva exhibited no abnormalities [Auscultation Breath Sounds / Voice Sounds] : lungs were clear to auscultation bilaterally [Heart Sounds] : normal S1 and S2 [Heart Rate And Rhythm] : heart rate and rhythm were normal [Murmurs] : no murmurs present [Arterial Pulses Normal] : the arterial pulses were normal [Edema] : no peripheral edema present [Bowel Sounds] : normal bowel sounds [Abdomen Soft] : soft [Abnormal Walk] : normal gait [Abdomen Tenderness] : non-tender [Cyanosis, Localized] : no localized cyanosis [Affect] : the affect was normal [] : no rash [No Anxiety] : not feeling anxious [FreeTextEntry1] : dorsalis pedis pulses +2 bilaterally

## 2023-08-09 NOTE — DISCUSSION/SUMMARY
[FreeTextEntry1] : Atherosclerotic heart disease Atherosclerotic heart disease is stable. A 4/15 stress sestamibi study was normal. An  CT coronary angiogram  demonstrated a mildly elevated calcium score at 86. The LAD had a 1-25% stenosis the left circumflex 25-50% stenosis . T he  RCA was patent. A  chest CAT scan demonstrated mild coronary artery calcifications The resting       electrocardiogram is normal  showing  no evidence of myocardial  ischemia or infarction. Left ventricular systolic function is normal as reflected by a left ventricular ejection fraction of 88% on the 4/15 gated sestamibi study  61% on  an  CT angiogram and 62% on the  echocardiogram  In view of the lack of angina, above noninvasive studies and clinical course continued medical management is indicated.  I have recommended the followin.Continue the present medical regimen  2 no further cardiac testing for this problem at this time 3 risk factor modification     Hypertension Hypertension has been controlled on the present medical regimen. In the setting of atherosclerotic heart disease and diabetes ACE-I/ARB therapy and beta blockers are attractive. Previous administration of ACE inhibitors ( lisinopril) were discontinued due to coughing. The doses of losartan  and/or  metoprolol may be increased if required to maintain optimal levels.  I have recommended the followin low-salt low-fat low-cholesterol diabetic diet. Regular aerobic exercise 2 continue present medical regimen 3 increase Cozaar  and/or metoprolol doses if required to maintain optimal levels as discussed above.     Valvular heart disease The   echocardiogram revealed mild thickening of the mitral valve leaflets and mild mitral insufficiency.  Aortic sclerosis  was noted.  The pulmonary artery systolic pressure was normal at 22 mmHg. The left ventricular  ejection fraction was 62%. The present cardiac physical examination is not suggestive of severe mitral regurgitation. In view of the lack of symptoms, absence of heart failure clinical course continued monitoring without intervention is indicated at this time.  I have recommended the followin. No further cardiac testing for this problem at this time     Hyperlipidemia Hyperlipidemia represents a risk factor for progressive atherosclerotic disease. In the setting of atherosclerotic heart disease the target LDL level is about 70. The most recent lipid levels are not available for review. The dose of atorvastatin may be increased if required to obtain optimal levels. Nonpharmacological therapy, specifically diet exercise and weight loss are emphasized as major aspects of treatment.  I have recommended the followin low salt low fat low cholesterol diet. Regular aerobic exercise and weight loss 2 continue the present medical regimen 3 routine laboratory studies including lipid profile through primary care Dr. Guo 4 target LDL level to about 70 as discussed above 5 increase atorvastatin dose if required to obtain optimal levels as noted above.      The diagnosis, prognosis, risks, options and alternatives were explained at length to the patient. All questions were answered. Issues discussed included atherosclerotic heart disease, hypertension, hyperlipidemia non invasive cardiac testing  valvular heart disease diet and exercise  Counseling and/or coordination of care Time was a significant factor this patient encounter. Total time spent with the patient was 25  minutes. 50% of the time was devoted to counseling and/or coordination of care.

## 2023-08-09 NOTE — HISTORY OF PRESENT ILLNESS
[FreeTextEntry1] : 83-year-old man Routine follow-up for atherosclerotic heart disease hypertension hyperlipidemia and valvular heart disease  "I feel okay."  Mr. Mercedes denies chest pain shortness of breath at rest palpitations ankle edema orthopnea or syncope.  He is physically active without restriction or limitation.  Dyspnea on exertion when going uphill is unchanged from in the past and not progressive or severe

## 2023-09-05 ENCOUNTER — RX RENEWAL (OUTPATIENT)
Age: 83
End: 2023-09-05

## 2023-10-23 ENCOUNTER — APPOINTMENT (OUTPATIENT)
Dept: GASTROENTEROLOGY | Facility: CLINIC | Age: 83
End: 2023-10-23
Payer: MEDICARE

## 2023-10-23 VITALS
DIASTOLIC BLOOD PRESSURE: 70 MMHG | BODY MASS INDEX: 26.33 KG/M2 | SYSTOLIC BLOOD PRESSURE: 140 MMHG | WEIGHT: 158 LBS | OXYGEN SATURATION: 98 % | HEIGHT: 65 IN | HEART RATE: 75 BPM

## 2023-10-23 PROCEDURE — 99214 OFFICE O/P EST MOD 30 MIN: CPT

## 2023-10-24 LAB — HEMOCCULT STL QL IA: NEGATIVE

## 2023-11-30 ENCOUNTER — RX RENEWAL (OUTPATIENT)
Age: 83
End: 2023-11-30

## 2023-11-30 RX ORDER — METOPROLOL SUCCINATE 25 MG/1
25 TABLET, EXTENDED RELEASE ORAL DAILY
Qty: 90 | Refills: 3 | Status: ACTIVE | COMMUNITY
Start: 2022-07-03 | End: 1900-01-01

## 2023-11-30 RX ORDER — LOSARTAN POTASSIUM 25 MG/1
25 TABLET, FILM COATED ORAL DAILY
Qty: 90 | Refills: 3 | Status: ACTIVE | COMMUNITY
Start: 2022-07-03 | End: 1900-01-01

## 2024-02-20 ENCOUNTER — APPOINTMENT (OUTPATIENT)
Dept: GASTROENTEROLOGY | Facility: CLINIC | Age: 84
End: 2024-02-20
Payer: MEDICARE

## 2024-02-20 VITALS
BODY MASS INDEX: 24.17 KG/M2 | HEIGHT: 67 IN | SYSTOLIC BLOOD PRESSURE: 136 MMHG | HEART RATE: 66 BPM | OXYGEN SATURATION: 97 % | DIASTOLIC BLOOD PRESSURE: 82 MMHG | RESPIRATION RATE: 16 BRPM | WEIGHT: 154 LBS

## 2024-02-20 DIAGNOSIS — Z12.11 ENCOUNTER FOR SCREENING FOR MALIGNANT NEOPLASM OF COLON: ICD-10-CM

## 2024-02-20 DIAGNOSIS — K52.9 NONINFECTIVE GASTROENTERITIS AND COLITIS, UNSPECIFIED: ICD-10-CM

## 2024-02-20 DIAGNOSIS — Z86.39 PERSONAL HISTORY OF OTHER ENDOCRINE, NUTRITIONAL AND METABOLIC DISEASE: ICD-10-CM

## 2024-02-20 PROCEDURE — 99214 OFFICE O/P EST MOD 30 MIN: CPT

## 2024-02-20 RX ORDER — SODIUM PICOSULFATE, MAGNESIUM OXIDE, AND ANHYDROUS CITRIC ACID 12; 3.5; 1 G/175ML; G/175ML; MG/175ML
10-3.5-12 MG-GM LIQUID ORAL
Qty: 2 | Refills: 1 | Status: ACTIVE | COMMUNITY
Start: 2024-02-20 | End: 1900-01-01

## 2024-02-20 NOTE — ASSESSMENT
[FreeTextEntry1] : 1.  Patient is physiologically younger than his stated age of 84 and really he is much closer physiologically to someone in their mid 70s 2.  On top of that, he is currently symptomatic with diarrhea 3.  He also has not had a good appetite and does not feel perfect anymore at least from the GI standpoint 4..  He has a history of adenomatous colonic polyps when he was younger but not the last several colonoscopies 5.  His sister had cancer of the colon  With all of these factors I strongly recommend colonoscopy at this time. I would use the Clenpiq bowel preparation which will be much easier for him He does of course have to continue his metoprolol 25 mg/day and his losartan 25 mg/day, but he should stop his metformin which she is on 3 times a day 500 mg a full 48 hours prior to the colonoscopy  More than 50% of the face to face time was devoted to counseling and /or coordination of care.  THis coordination of care may have included reviewing other medical notes and reports, and communicating with other health professionals AS WE OBTAIN INFORMED CONSENT FOR COLONOSCOPY, UPPER ENDOSCOPY [EGD], OR BOTH PROCEDURES TOGETHER;  As with all procedures, there are risks of which the patient should be aware  1.  Anesthesia; deep sedation with Propofol;  there is a small risk of aspiration and pulmonary infection.  The Anesthesiologist meets with the patient the morning of the procedure to discuss in more detail  2.  risk of bleeding; from removal of a polyp, or rarely, from biopsies, 1 % or less  3.  risk of injury or perforation of the colon or upper GI tract; one in a thousand or less,  from removing a polyp or from advancing the instrument  send copies of note to louie Hoover, [facility]

## 2024-02-20 NOTE — HISTORY OF PRESENT ILLNESS
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  When the loose bowels began, there was consideration to the diagnosis of metformin induced diarrhea, and he was taken off metformin and put on Januvia, which controlled his diabetes less effectively and did not significantly improve his diarrhea.  Therefore he was switched back to metformin which she is now on 500 mg 3 times a day He also takes metoprolol 25 mg/day and losartan 25 mg/day There is no angina no cardiovascular disease no history of acute coronary syndrome. His appetite has been diminished but he is not lost weight He did have an EGD several years ago, which was unremarkable His sister had a history of colon cancer The patient has had colonoscopies earlier on which showed polyps but not the last several colonoscopies

## 2024-02-27 ENCOUNTER — NON-APPOINTMENT (OUTPATIENT)
Age: 84
End: 2024-02-27

## 2024-02-27 ENCOUNTER — APPOINTMENT (OUTPATIENT)
Dept: CARDIOLOGY | Facility: CLINIC | Age: 84
End: 2024-02-27
Payer: MEDICARE

## 2024-02-27 VITALS
HEART RATE: 81 BPM | OXYGEN SATURATION: 99 % | WEIGHT: 155 LBS | DIASTOLIC BLOOD PRESSURE: 71 MMHG | BODY MASS INDEX: 24.28 KG/M2 | SYSTOLIC BLOOD PRESSURE: 139 MMHG

## 2024-02-27 DIAGNOSIS — I34.0 NONRHEUMATIC MITRAL (VALVE) INSUFFICIENCY: ICD-10-CM

## 2024-02-27 DIAGNOSIS — E78.5 HYPERLIPIDEMIA, UNSPECIFIED: ICD-10-CM

## 2024-02-27 DIAGNOSIS — I10 ESSENTIAL (PRIMARY) HYPERTENSION: ICD-10-CM

## 2024-02-27 DIAGNOSIS — I38 ENDOCARDITIS, VALVE UNSPECIFIED: ICD-10-CM

## 2024-02-27 DIAGNOSIS — I25.10 ATHEROSCLEROTIC HEART DISEASE OF NATIVE CORONARY ARTERY W/OUT ANGINA PECTORIS: ICD-10-CM

## 2024-02-27 DIAGNOSIS — Z87.19 PERSONAL HISTORY OF OTHER DISEASES OF THE DIGESTIVE SYSTEM: ICD-10-CM

## 2024-02-27 DIAGNOSIS — K31.84 GASTROPARESIS: ICD-10-CM

## 2024-02-27 PROCEDURE — 99213 OFFICE O/P EST LOW 20 MIN: CPT

## 2024-02-27 PROCEDURE — 93000 ELECTROCARDIOGRAM COMPLETE: CPT

## 2024-03-03 PROBLEM — I25.10 ATHEROSCLEROTIC HEART DISEASE: Status: ACTIVE | Noted: 2019-02-26

## 2024-03-03 PROBLEM — I10 HYPERTENSION: Status: ACTIVE | Noted: 2019-02-26

## 2024-03-03 PROBLEM — K31.84 GASTROPARESIS: Status: RESOLVED | Noted: 2023-04-04 | Resolved: 2024-03-03

## 2024-03-03 PROBLEM — Z87.19 HISTORY OF GASTROESOPHAGEAL REFLUX (GERD): Status: RESOLVED | Noted: 2024-03-03 | Resolved: 2024-03-03

## 2024-03-03 PROBLEM — I34.0 MITRAL REGURGITATION: Status: ACTIVE | Noted: 2020-06-04

## 2024-03-03 PROBLEM — I38 VALVULAR HEART DISEASE: Status: ACTIVE | Noted: 2019-02-26

## 2024-03-03 PROBLEM — E78.5 HYPERLIPIDEMIA: Status: ACTIVE | Noted: 2019-02-26

## 2024-03-03 NOTE — HISTORY OF PRESENT ILLNESS
[FreeTextEntry1] : 84-year-old man Routine follow-up for atherosclerotic heart disease valvular heart disease hypertension and hyperlipidemia.  "I feel okay."  Royer reports random chest "heaviness" at rest without associated diaphoresis nausea or dyspnea.  Main complaint is that of "neuropathy" involving the lower extremities.  No exertional chest pain.  No palpitation.  No syncope.

## 2024-03-03 NOTE — PHYSICAL EXAM
[Normal Conjunctiva] : the conjunctiva exhibited no abnormalities [Auscultation Breath Sounds / Voice Sounds] : lungs were clear to auscultation bilaterally [Heart Rate And Rhythm] : heart rate and rhythm were normal [Heart Sounds] : normal S1 and S2 [Murmurs] : no murmurs present [Edema] : no peripheral edema present [Arterial Pulses Normal] : the arterial pulses were normal [Abdomen Soft] : soft [Bowel Sounds] : normal bowel sounds [Abdomen Tenderness] : non-tender [Abnormal Walk] : normal gait [Cyanosis, Localized] : no localized cyanosis [] : no rash [Affect] : the affect was normal [No Anxiety] : not feeling anxious [FreeTextEntry1] : dorsalis pedis pulses +2 bilaterally

## 2024-03-03 NOTE — DISCUSSION/SUMMARY
[FreeTextEntry1] : Atherosclerotic heart disease Atherosclerotic heart disease is stable. A 4/15 stress sestamibi study was normal. An  CT coronary angiogram  demonstrated a mildly elevated calcium score at 86. The LAD had a 1-25% stenosis the left circumflex 25-50% stenosis . T he  RCA was patent. A  chest CAT scan demonstrated mild coronary artery calcifications The resting       electrocardiogram is normal  showing  no evidence of myocardial  ischemia or infarction. Left ventricular systolic function is normal as reflected by a left ventricular ejection fraction of 88% on the 4/15 gated sestamibi study  61% on  an  CT angiogram and 62% on the  echocardiogram  In view of the lack of angina, above noninvasive studies and clinical course continued medical management is indicated.  I have recommended the followin.Continue the present medical regimen  2 no further cardiac testing for this problem at this time 3 risk factor modification  4. Exercise treadmill ECG study with next office evaluation    Hypertension Hypertension has been controlled on the present medical regimen. In the setting of atherosclerotic heart disease and diabetes ACE-I/ARB therapy and beta blockers are attractive. Previous administration of ACE inhibitors ( lisinopril) were discontinued due to coughing. The doses of losartan  and/or  metoprolol may be increased if required to maintain optimal levels.  I have recommended the followin low-salt low-fat low-cholesterol diabetic diet. Regular aerobic exercise 2 continue present medical regimen 3 increase Cozaar  and/or metoprolol doses if required to maintain optimal levels as discussed above.     Valvular heart disease /mitral  regurgitation The   echocardiogram revealed mild thickening of the mitral valve leaflets and mild mitral insufficiency.  Aortic sclerosis  was noted.  The pulmonary artery systolic pressure was normal at 22 mmHg. The left ventricular  ejection fraction was 62%. The present cardiac physical examination is not suggestive of severe mitral regurgitation. In view of the lack of symptoms, absence of heart failure clinical course continued monitoring without intervention is indicated at this time.  I have recommended the followin. No further cardiac testing for this problem at this time 2. Echocardiogram with next office evaluation     Hyperlipidemia Hyperlipidemia represents a risk factor for progressive atherosclerotic disease. In the setting of atherosclerotic heart disease the target LDL level is about 70. The most recent lipid levels are not available for review. The dose of atorvastatin may be increased if required to obtain optimal levels. Nonpharmacological therapy, specifically diet exercise and weight loss are emphasized as major aspects of treatment.  I have recommended the followin low salt low fat low cholesterol diet. Regular aerobic exercise and weight loss 2 continue the present medical regimen 3 routine laboratory studies including lipid profile through primary care Dr. Guo 4 target LDL level to about 70 as discussed above 5 increase atorvastatin dose if required to obtain optimal levels as noted above.      The diagnosis, prognosis, risks, options and alternatives were explained at length to the patient. All questions were answered. Issues discussed included atherosclerotic heart disease, hypertension, hyperlipidemia non invasive cardiac testing  valvular heart disease diet and exercise  Counseling and/or coordination of care Time was a significant factor this patient encounter. Total time spent with the patient was 25  minutes. 50% of the time was devoted to counseling and/or coordination of care.

## 2024-03-14 ENCOUNTER — TRANSCRIPTION ENCOUNTER (OUTPATIENT)
Age: 84
End: 2024-03-14

## 2024-03-14 ENCOUNTER — APPOINTMENT (OUTPATIENT)
Dept: GASTROENTEROLOGY | Facility: HOSPITAL | Age: 84
End: 2024-03-14

## 2024-03-14 ENCOUNTER — RESULT REVIEW (OUTPATIENT)
Age: 84
End: 2024-03-14

## 2024-06-20 ENCOUNTER — NON-APPOINTMENT (OUTPATIENT)
Age: 84
End: 2024-06-20

## 2024-08-26 ENCOUNTER — APPOINTMENT (OUTPATIENT)
Dept: CARDIOLOGY | Facility: CLINIC | Age: 84
End: 2024-08-26
Payer: MEDICARE

## 2024-08-26 DIAGNOSIS — I34.0 NONRHEUMATIC MITRAL (VALVE) INSUFFICIENCY: ICD-10-CM

## 2024-08-26 DIAGNOSIS — Z86.79 PERSONAL HISTORY OF OTHER DISEASES OF THE CIRCULATORY SYSTEM: ICD-10-CM

## 2024-08-26 PROCEDURE — 93306 TTE W/DOPPLER COMPLETE: CPT

## 2024-09-10 ENCOUNTER — APPOINTMENT (OUTPATIENT)
Dept: CARDIOLOGY | Facility: CLINIC | Age: 84
End: 2024-09-10
Payer: MEDICARE

## 2024-09-10 DIAGNOSIS — R94.31 ABNORMAL ELECTROCARDIOGRAM [ECG] [EKG]: ICD-10-CM

## 2024-09-10 PROCEDURE — 93015 CV STRESS TEST SUPVJ I&R: CPT

## 2024-10-24 ENCOUNTER — RESULT REVIEW (OUTPATIENT)
Age: 84
End: 2024-10-24

## 2024-10-25 ENCOUNTER — NON-APPOINTMENT (OUTPATIENT)
Age: 84
End: 2024-10-25

## 2024-10-25 DIAGNOSIS — I25.10 ATHEROSCLEROTIC HEART DISEASE OF NATIVE CORONARY ARTERY W/OUT ANGINA PECTORIS: ICD-10-CM

## 2025-03-26 ENCOUNTER — APPOINTMENT (OUTPATIENT)
Dept: CARDIOLOGY | Facility: CLINIC | Age: 85
End: 2025-03-26
Payer: MEDICARE

## 2025-03-26 ENCOUNTER — NON-APPOINTMENT (OUTPATIENT)
Age: 85
End: 2025-03-26

## 2025-03-26 VITALS
HEART RATE: 88 BPM | OXYGEN SATURATION: 99 % | DIASTOLIC BLOOD PRESSURE: 67 MMHG | BODY MASS INDEX: 23.96 KG/M2 | WEIGHT: 153 LBS | SYSTOLIC BLOOD PRESSURE: 137 MMHG

## 2025-03-26 DIAGNOSIS — I34.0 NONRHEUMATIC MITRAL (VALVE) INSUFFICIENCY: ICD-10-CM

## 2025-03-26 DIAGNOSIS — E78.5 HYPERLIPIDEMIA, UNSPECIFIED: ICD-10-CM

## 2025-03-26 DIAGNOSIS — I10 ESSENTIAL (PRIMARY) HYPERTENSION: ICD-10-CM

## 2025-03-26 DIAGNOSIS — I38 ENDOCARDITIS, VALVE UNSPECIFIED: ICD-10-CM

## 2025-03-26 DIAGNOSIS — I25.10 ATHEROSCLEROTIC HEART DISEASE OF NATIVE CORONARY ARTERY W/OUT ANGINA PECTORIS: ICD-10-CM

## 2025-03-26 PROCEDURE — 99213 OFFICE O/P EST LOW 20 MIN: CPT

## 2025-03-26 PROCEDURE — 93000 ELECTROCARDIOGRAM COMPLETE: CPT

## 2025-03-26 PROCEDURE — G2211 COMPLEX E/M VISIT ADD ON: CPT

## 2025-03-31 ENCOUNTER — NON-APPOINTMENT (OUTPATIENT)
Age: 85
End: 2025-03-31